# Patient Record
Sex: MALE | Race: WHITE | NOT HISPANIC OR LATINO | Employment: FULL TIME | URBAN - METROPOLITAN AREA
[De-identification: names, ages, dates, MRNs, and addresses within clinical notes are randomized per-mention and may not be internally consistent; named-entity substitution may affect disease eponyms.]

---

## 2017-10-27 ENCOUNTER — OFFICE VISIT (OUTPATIENT)
Dept: URGENT CARE | Facility: CLINIC | Age: 52
End: 2017-10-27
Payer: MEDICARE

## 2017-10-27 PROCEDURE — 99203 OFFICE O/P NEW LOW 30 MIN: CPT

## 2017-10-27 PROCEDURE — G0463 HOSPITAL OUTPT CLINIC VISIT: HCPCS

## 2017-10-28 NOTE — PROGRESS NOTES
Assessment  1  Acute sinusitis (461 9) (J01 90)   2  Multiple wounds of skin (782 9) (R23 8)    Plan  Multiple wounds of skin    · Clindamycin HCl - 300 MG Oral Capsule; TAKE 1 CAPSULE EVERY 8 HOURS  DAILY    Discussion/Summary  Discussion Summary:   1) take abx as prescribedfrequent nasal saline rinsefollow up with PCP regarding skin wounds  Chief Complaint  1  Cold Symptoms  Chief Complaint Free Text Note Form: Patient relates started with sinus pain, pressure and post nasal drip x1 week  Also, has a wound on left big toe for 2 months and under left breast for 3 weeks  Denies fever  Patient is not a diabetic  History of Present Illness  HPI: 55yo M p/w sinus pain and pressure x 1 week and recurrent skin wounds x 2 mo  Pt denies n/v/d, sob/wheezing, fever/chills  Pt states skin wounds presents as small bumps which form blisters and then drain clear fluid to form erosions  Pt presents with erosion on L great toe and beneath L breast    Hospital Based Practices Required Assessment: Reason DV Screen not done: family at bedside    Depression And Suicide Screen  Reason suicide screen not done: family at bedside  Prefered Language is  english  Primary Language is  english  Review of Systems  Focused-Male:   Constitutional: no fever or chills, feels well, no tiredness, no recent weight loss or weight gain  ENT: nasal discharge, but-- no complaints of earache, no loss of hearing, no nosebleeds or nasal discharge, no sore throat or hoarseness,-- no earache,-- no nosebleeds,-- no sore throat,-- no hearing loss-- and-- no hoarseness  Cardiovascular: no complaints of slow or fast heart rate, no chest pain, no palpitations, no leg claudication or lower extremity edema  Respiratory: no complaints of shortness of breath, no wheezing or cough, no dyspnea on exertion, no orthopnea or PND     Gastrointestinal: no complaints of abdominal pain, no constipation, no nausea or vomiting, no diarrhea or bloody stools  Genitourinary: no complaints of dysuria or incontinence, no hesitancy, no nocturia, no genital lesion, no inadequacy of penile erection  Musculoskeletal: no complaints of arthralgia, no myalgia, no joint swelling or stiffness, no limb pain or swelling  Integumentary: skin wound, but-- no rashes,-- no itching,-- no dry skin-- and-- no skin lesions  Neurological: no complaints of headache, no confusion, no numbness or tingling, no dizziness or fainting  ROS Reviewed:   ROS reviewed  Active Problems  1  Acute sinusitis (461 9) (J01 90)    Past Medical History  1  History of depression (V11 8) (Z86 59)   2  History of hiatal hernia (V12 79) (Z87 19)   3  History of migraine (V12 49) (Z86 69)   4  History of renal failure (V13 09) (Q28 091)  Active Problems And Past Medical History Reviewed: The active problems and past medical history were reviewed and updated today  Family History  Family History Reviewed: The family history was reviewed and updated today  Social History   · Never smoker  Social History Reviewed: The social history was reviewed and is unchanged  Surgical History  1  History of Surgery Vas Deferens Vasectomy   2  History of Umbilical Hernia Repair  Surgical History Reviewed: The surgical history was reviewed and updated today  Current Meds   1  Flonase Allergy Relief 50 MCG/ACT Nasal Suspension; Therapy: (Recorded:27Oct2017) to Recorded   2  Imitrex TABS; Therapy: (Recorded:27Oct2017) to Recorded   3  Tylenol with Codeine #3 300-30 MG Oral Tablet; Therapy: ([id number]) to Recorded   4  Zoloft 100 MG Oral Tablet; Therapy: ([id number]) to Recorded  Medication List Reviewed: The medication list was reviewed and updated today  Allergies  1  Darvocet A500 TABS   2   Percocet TABS    Vitals  Signs   Recorded: [id number] 01:10PM   Temperature: 97 9 F, Tympanic  Heart Rate: 65  Respiration: 18  Systolic: 222, LUE, Sitting  Diastolic: 75, LUE, Sitting  Height: 6 ft   Weight: 208 lb   BMI Calculated: 28 21  BSA Calculated: 2 17  O2 Saturation: 97, RA  Pain Scale: 7    Physical Exam    Constitutional   General appearance: No acute distress, well appearing and well nourished  Ears, Nose, Mouth, and Throat   External inspection of ears and nose: Normal     Otoscopic examination: Tympanic membrance translucent with normal light reflex  Canals patent without erythema  Nasal mucosa, septum, and turbinates: Abnormal   There was a purulent discharge from both nares  The bilateral nasal mucosa was edematous  Oropharynx: Abnormal  -- purulent post nasal drip  Pulmonary   Respiratory effort: No increased work of breathing or signs of respiratory distress  Auscultation of lungs: Clear to auscultation  no rales or crackles were heard bilaterally  no rhonchi  no friction rub  no wheezing  Cardiovascular   Palpation of heart: Normal PMI, no thrills  Auscultation of heart: Normal rate and rhythm, normal S1 and S2, without murmurs  Abdomen   Abdomen: Non-tender, no masses  Liver and spleen: No hepatomegaly or splenomegaly  Lymphatic   Palpation of lymph nodes in neck: Abnormal   bilateral anterior cervical node enlargement, but-- no posterior cervical node enlargement  Musculoskeletal   Inspection/palpation of joints, bones, and muscles: Normal     Skin   Skin and subcutaneous tissue: Abnormal  -- 4mm erythematous erosion beneath L nipple and on anterior aspect of L great toe; no purulent drainage  Neurologic   Cranial nerves: Cranial nerves 2-12 intact  Reflexes: 2+ and symmetric  Sensation: No sensory loss  -- pt vascularly and neurologically intact     Psychiatric   Orientation to person, place and time: Normal     Mood and affect: Normal        Signatures   Electronically signed by : JACQUELYN Márquez; Oct 27 2017  1:43PM EST                       (Author)    Electronically signed by : Isis Crisostomo LATOYA BHAKTA ; Oct 27 2017  1:43PM EST                       (Co-author)

## 2018-08-15 ENCOUNTER — DOCTOR'S OFFICE (OUTPATIENT)
Dept: URBAN - NONMETROPOLITAN AREA CLINIC 1 | Facility: CLINIC | Age: 53
Setting detail: OPHTHALMOLOGY
End: 2018-08-15
Payer: COMMERCIAL

## 2018-08-15 DIAGNOSIS — H52.03: ICD-10-CM

## 2018-08-15 DIAGNOSIS — G43.801: ICD-10-CM

## 2018-08-15 DIAGNOSIS — H25.13: ICD-10-CM

## 2018-08-15 PROCEDURE — 92083 EXTENDED VISUAL FIELD XM: CPT | Performed by: OPHTHALMOLOGY

## 2018-08-15 PROCEDURE — 92014 COMPRE OPH EXAM EST PT 1/>: CPT | Performed by: OPHTHALMOLOGY

## 2018-08-15 ASSESSMENT — REFRACTION_MANIFEST
OU_VA: 20/
OS_VA1: 20/
OD_VA2: 20/
OD_VA3: 20/
OD_VA3: 20/
OD_VA1: 20/
OS_VA3: 20/
OD_VA2: 20/
OD_VA1: 20/
OS_VA2: 20/
OS_VA1: 20/
OS_VA3: 20/
OS_VA2: 20/
OU_VA: 20/

## 2018-08-15 ASSESSMENT — VISUAL ACUITY
OD_BCVA: 20/20-1
OS_BCVA: 20/25-1

## 2018-08-15 ASSESSMENT — REFRACTION_CURRENTRX
OS_SPHERE: +1.00
OS_OVR_VA: 20/
OD_OVR_VA: 20/
OS_VPRISM_DIRECTION: SV
OD_OVR_VA: 20/
OD_VPRISM_DIRECTION: SV
OD_OVR_VA: 20/
OS_OVR_VA: 20/
OD_SPHERE: +1.00
OS_OVR_VA: 20/

## 2018-08-15 ASSESSMENT — REFRACTION_OUTSIDERX
OU_VA: 20/20
OD_SPHERE: +0.50
OS_SPHERE: +0.50
OS_VA2: 20/20
OD_ADD: +2.00
OS_VA3: 20/
OD_VA2: 20/20
OS_ADD: +2.00
OS_VA1: 20/20
OD_VA3: 20/
OD_VA1: 20/20

## 2018-08-15 ASSESSMENT — CONFRONTATIONAL VISUAL FIELD TEST (CVF)
OS_FINDINGS: FULL
OD_FINDINGS: FULL

## 2018-08-15 ASSESSMENT — REFRACTION_AUTOREFRACTION
OS_AXIS: 15
OD_AXIS: 15
OS_CYLINDER: -0.25
OD_CYLINDER: -0.25
OD_SPHERE: +1.00
OS_SPHERE: +1.25

## 2018-08-15 ASSESSMENT — SPHEQUIV_DERIVED
OD_SPHEQUIV: 0.875
OS_SPHEQUIV: 1.125

## 2021-01-12 ENCOUNTER — DOCTOR'S OFFICE (OUTPATIENT)
Dept: URBAN - NONMETROPOLITAN AREA CLINIC 1 | Facility: CLINIC | Age: 56
Setting detail: OPHTHALMOLOGY
End: 2021-01-12
Payer: COMMERCIAL

## 2021-01-12 DIAGNOSIS — H40.033: ICD-10-CM

## 2021-01-12 DIAGNOSIS — H25.13: ICD-10-CM

## 2021-01-12 DIAGNOSIS — H10.502: ICD-10-CM

## 2021-01-12 PROBLEM — G43.801 OCULAR MIGRAINE NEC/NOT INTRACTABLE: Status: RESOLVED | Noted: 2018-08-15 | Resolved: 2021-01-12

## 2021-01-12 PROCEDURE — 92012 INTRM OPH EXAM EST PATIENT: CPT | Performed by: OPHTHALMOLOGY

## 2021-01-12 PROCEDURE — 92132 CPTRZD OPH DX IMG ANT SGM: CPT | Performed by: OPHTHALMOLOGY

## 2021-01-12 ASSESSMENT — CONFRONTATIONAL VISUAL FIELD TEST (CVF)
OS_FINDINGS: FULL
OD_FINDINGS: FULL

## 2021-01-12 ASSESSMENT — REFRACTION_MANIFEST
OU_VA: 20/20
OS_VA1: 20/20
OS_SPHERE: +0.50
OD_VA1: 20/20
OD_ADD: +2.00
OD_SPHERE: +0.50
OS_VA2: 20/20
OS_ADD: +2.00
OD_VA2: 20/20

## 2021-01-12 ASSESSMENT — REFRACTION_CURRENTRX
OD_OVR_VA: 20/
OD_SPHERE: +1.00
OS_SPHERE: +1.00
OS_OVR_VA: 20/
OD_VPRISM_DIRECTION: SV
OS_VPRISM_DIRECTION: SV

## 2021-01-12 ASSESSMENT — REFRACTION_AUTOREFRACTION
OD_AXIS: 15
OD_SPHERE: +1.00
OD_CYLINDER: -0.25
OS_CYLINDER: -0.25
OS_AXIS: 15
OS_SPHERE: +1.25

## 2021-01-12 ASSESSMENT — VISUAL ACUITY
OD_BCVA: 20/25-1
OS_BCVA: 20/20-1

## 2021-01-12 ASSESSMENT — SPHEQUIV_DERIVED
OS_SPHEQUIV: 1.125
OD_SPHEQUIV: 0.875

## 2021-12-05 ENCOUNTER — HOSPITAL ENCOUNTER (EMERGENCY)
Facility: HOSPITAL | Age: 56
Discharge: HOME | End: 2021-12-05
Attending: EMERGENCY MEDICINE | Admitting: EMERGENCY MEDICINE
Payer: COMMERCIAL

## 2021-12-05 ENCOUNTER — APPOINTMENT (EMERGENCY)
Dept: RADIOLOGY | Facility: HOSPITAL | Age: 56
End: 2021-12-05
Payer: COMMERCIAL

## 2021-12-05 VITALS
HEIGHT: 72 IN | RESPIRATION RATE: 16 BRPM | TEMPERATURE: 98.8 F | BODY MASS INDEX: 33.15 KG/M2 | SYSTOLIC BLOOD PRESSURE: 133 MMHG | DIASTOLIC BLOOD PRESSURE: 91 MMHG | WEIGHT: 244.71 LBS | HEART RATE: 82 BPM | OXYGEN SATURATION: 96 %

## 2021-12-05 DIAGNOSIS — U07.1 COVID-19 VIRUS INFECTION: Primary | ICD-10-CM

## 2021-12-05 DIAGNOSIS — J12.82 PNEUMONIA DUE TO COVID-19 VIRUS: ICD-10-CM

## 2021-12-05 DIAGNOSIS — U07.1 PNEUMONIA DUE TO COVID-19 VIRUS: ICD-10-CM

## 2021-12-05 LAB
FLUAV RNA RESP QL NAA+PROBE: NEGATIVE
FLUBV RNA RESP QL NAA+PROBE: NEGATIVE
RSV RNA RESP QL NAA+PROBE: NEGATIVE
SARS-COV-2 RNA RESP QL NAA+PROBE: POSITIVE

## 2021-12-05 PROCEDURE — 0241U HB NFCT DS VIR RESP RNA 4 TRGT: CPT | Performed by: EMERGENCY MEDICINE

## 2021-12-05 PROCEDURE — 99283 EMERGENCY DEPT VISIT LOW MDM: CPT

## 2021-12-05 PROCEDURE — 71045 X-RAY EXAM CHEST 1 VIEW: CPT

## 2021-12-05 PROCEDURE — 99285 EMERGENCY DEPT VISIT HI MDM: CPT | Performed by: EMERGENCY MEDICINE

## 2021-12-05 RX ORDER — DOXYCYCLINE HYCLATE 100 MG/1
100 CAPSULE ORAL 2 TIMES DAILY
Qty: 20 CAPSULE | Refills: 0 | Status: SHIPPED | OUTPATIENT
Start: 2021-12-05 | End: 2021-12-15

## 2021-12-05 RX ORDER — PREDNISONE 20 MG/1
40 TABLET ORAL ONCE
Status: DISCONTINUED | OUTPATIENT
Start: 2021-12-05 | End: 2021-12-05 | Stop reason: HOSPADM

## 2021-12-05 RX ORDER — DOXYCYCLINE HYCLATE 100 MG/1
100 CAPSULE ORAL ONCE
Status: DISCONTINUED | OUTPATIENT
Start: 2021-12-05 | End: 2021-12-05 | Stop reason: HOSPADM

## 2021-12-05 RX ORDER — PREDNISONE 20 MG/1
40 TABLET ORAL DAILY
Qty: 10 TABLET | Refills: 0 | Status: SHIPPED | OUTPATIENT
Start: 2021-12-05 | End: 2021-12-10

## 2021-12-05 RX ORDER — CETIRIZINE HYDROCHLORIDE 10 MG/1
10 TABLET ORAL DAILY
COMMUNITY

## 2021-12-05 RX ORDER — SERTRALINE HYDROCHLORIDE 100 MG/1
100 TABLET, FILM COATED ORAL
COMMUNITY

## 2021-12-05 RX ORDER — FLUTICASONE PROPIONATE 50 MCG
2 SPRAY, SUSPENSION (ML) NASAL DAILY
COMMUNITY

## 2021-12-23 ENCOUNTER — HOSPITAL ENCOUNTER (OUTPATIENT)
Dept: RADIOLOGY | Facility: HOSPITAL | Age: 56
Discharge: HOME | End: 2021-12-23
Payer: COMMERCIAL

## 2021-12-23 DIAGNOSIS — J12.82 PNEUMONIA DUE TO COVID-19 VIRUS: ICD-10-CM

## 2021-12-23 DIAGNOSIS — U07.1 PNEUMONIA DUE TO COVID-19 VIRUS: ICD-10-CM

## 2021-12-23 PROCEDURE — 71046 X-RAY EXAM CHEST 2 VIEWS: CPT
